# Patient Record
Sex: FEMALE | Race: WHITE | NOT HISPANIC OR LATINO | URBAN - METROPOLITAN AREA
[De-identification: names, ages, dates, MRNs, and addresses within clinical notes are randomized per-mention and may not be internally consistent; named-entity substitution may affect disease eponyms.]

---

## 2025-04-15 ENCOUNTER — APPOINTMENT (OUTPATIENT)
Dept: SURGERY | Facility: CLINIC | Age: 75
End: 2025-04-15
Payer: MEDICARE

## 2025-04-15 DIAGNOSIS — K82.8 BILIARY DYSKINESIA: Primary | ICD-10-CM

## 2025-04-15 PROCEDURE — 1159F MED LIST DOCD IN RCRD: CPT | Performed by: SURGERY

## 2025-04-15 PROCEDURE — 99203 OFFICE O/P NEW LOW 30 MIN: CPT | Performed by: SURGERY

## 2025-04-15 PROCEDURE — 1160F RVW MEDS BY RX/DR IN RCRD: CPT | Performed by: SURGERY

## 2025-04-15 PROCEDURE — 1036F TOBACCO NON-USER: CPT | Performed by: SURGERY

## 2025-04-15 RX ORDER — HYDROGEN PEROXIDE 3 %
SOLUTION, NON-ORAL MISCELLANEOUS
COMMUNITY

## 2025-04-15 RX ORDER — SERTRALINE HYDROCHLORIDE 25 MG/1
TABLET, FILM COATED ORAL
COMMUNITY

## 2025-04-15 NOTE — PROGRESS NOTES
Subjective   Patient ID: Alicja Crystal is a 74 y.o. female who presents for Abdominal Pain.  The patient has intermittent severe right upper quadrant pain with radiation to the back.  Patient had extensive workup previously, included ERCP with sludge removal.  Recent HIDA scan showed ejection fraction of 1%    HPI as described above  Review of Systems GI consistent with right upper quadrant pain.  Review of all other 10 system is negative.  Physical Exam Pupils equal bilaterally, oral mucosa moist, bilateral breath sounds, clear to auscultation, regular rhythm and rate, no murmurs, abdomen is soft, nontender, nondistended, no palpable hernias, palpable peripheral pulse, no focal neurological motor deficits.  ENT exam within normal limits.  Musculoskeletal exam within normal limits.      Objective I reviewed all available data including lab results, radiological studies, previous reports and notes.  CT scan within normal limits.  Last gallbladder ultrasound was 4 years ago, within normal limits.  ERCP which was done for patient's symptoms showed sludge in the common bile duct.  HIDA scan showed ejection fraction 1% with reproducible symptoms    No diagnosis found.   There is no problem list on file for this patient.     No Known Allergies   Medication Documentation Review Audit       Reviewed by Feng Masters MD (Physician) on 04/15/25 at 0926      Medication Order Taking? Sig Documenting Provider Last Dose Status            No Medications to Display                                   Past Medical History:   Diagnosis Date    Personal history of other diseases of the digestive system     History of hiatal hernia     Social History     Tobacco Use   Smoking Status Never   Smokeless Tobacco Never     No family history on file.   Past Surgical History:   Procedure Laterality Date    TONSILLECTOMY  04/13/2017    Tonsillectomy       Assessment/Plan     Patient with symptomatic biliary dyskinesia.  The patient has  indication for laparoscopic, possible open cholecystectomy for symptomatic biliary dyskinesia.  The patient clearly understand that surgical intervention may not completely resolve her symptoms which may prompt further GI assessment.  Risks, benefits, alternative treatment were explained to the patient.  All questions were answered.  Informed consent was obtained.    Feng Masters MD

## 2025-07-24 ENCOUNTER — APPOINTMENT (OUTPATIENT)
Dept: SURGERY | Facility: CLINIC | Age: 75
End: 2025-07-24
Payer: MEDICARE

## 2025-07-24 DIAGNOSIS — K82.8 BILIARY DYSKINESIA: Primary | ICD-10-CM

## 2025-07-24 DIAGNOSIS — K80.20 CALCULUS OF GALLBLADDER WITHOUT CHOLECYSTITIS WITHOUT OBSTRUCTION: ICD-10-CM

## 2025-07-24 PROCEDURE — 99024 POSTOP FOLLOW-UP VISIT: CPT | Performed by: SURGERY

## 2025-07-24 PROCEDURE — 1160F RVW MEDS BY RX/DR IN RCRD: CPT | Performed by: SURGERY

## 2025-07-24 PROCEDURE — 1159F MED LIST DOCD IN RCRD: CPT | Performed by: SURGERY

## 2025-07-24 RX ORDER — LANOLIN ALCOHOL/MO/W.PET/CERES
500 CREAM (GRAM) TOPICAL
COMMUNITY
Start: 2025-06-30

## 2025-07-24 RX ORDER — VITAMIN E 268 MG
180 CAPSULE ORAL
COMMUNITY
Start: 2025-06-30

## 2025-07-24 RX ORDER — ELECTROLYTES/DEXTROSE
5 SOLUTION, ORAL ORAL
COMMUNITY
Start: 2025-07-01

## 2025-07-24 NOTE — PROGRESS NOTES
Subjective   Patient ID: Alicja Crystal is a 75 y.o. female who presents for Post-op.  She has no complaints  HPI  Review of Systems  Physical Exam  All incisions are by the primary intention  Objective allergy consistent with cholelithiasis, more than 50 small stones    No diagnosis found.   Problem List[1]   RX Allergies[2]   Medication Documentation Review Audit       Reviewed by Feng Masters MD (Physician) on 07/24/25 at 1205      Medication Order Taking? Sig Documenting Provider Last Dose Status   alpha tocopherol (Vitamin E) 268 mg (400 unit) capsule [id number] Yes 180 mg. Historical Provider, MD  Active   BACILLUS COAGULANS-INULIN ORAL [id number]  Take by mouth. Historical Provider, MD  Active   biotin 5 mg capsule [id number] Yes 1 capsule (5 mg). Historical Provider, MD  Active   cyanocobalamin (Vitamin B-12) 1,000 mcg tablet [id number] Yes 0.5 tablets (500 mcg). Historical Provider, MD  Active   esomeprazole (NexIUM) 20 mg DR capsule [id number] Yes Take by mouth. Historical Provider, MD  Active   omega-3-dha-epa-dpa-fish oil 1,050 mg(300 mg -675 mg-75 mg) capsule [id number] Yes 1,000 mg. Historical Provider, MD  Active   sertraline (Zoloft) 25 mg tablet [id number] Yes Take by mouth. Historical Provider, MD  Active                    Medical History[3]  Tobacco Use History[4]  Family History[5]   Surgical History[6]    Assessment/Plan     No evidence of surgical complications, follow-up as needed    Feng Masters MD          [1] There is no problem list on file for this patient.  [2] No Known Allergies  [3]   Past Medical History:  Diagnosis Date    Personal history of other diseases of the digestive system     History of hiatal hernia   [4]   Social History  Tobacco Use   Smoking Status Former    Types: Cigarettes   Smokeless Tobacco Never   [5] No family history on file.  [6]   Past Surgical History:  Procedure Laterality Date    TONSILLECTOMY  04/13/2017    Tonsillectomy